# Patient Record
Sex: MALE | Race: OTHER | Employment: FULL TIME | URBAN - METROPOLITAN AREA
[De-identification: names, ages, dates, MRNs, and addresses within clinical notes are randomized per-mention and may not be internally consistent; named-entity substitution may affect disease eponyms.]

---
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
## 2018-04-11 ENCOUNTER — TELEPHONE (OUTPATIENT)
Dept: INTERNAL MEDICINE CLINIC | Facility: CLINIC | Age: 18
End: 2018-04-11

## 2018-12-05 ENCOUNTER — OFFICE VISIT (OUTPATIENT)
Dept: FAMILY MEDICINE CLINIC | Facility: CLINIC | Age: 18
End: 2018-12-05
Payer: COMMERCIAL

## 2018-12-05 VITALS
DIASTOLIC BLOOD PRESSURE: 78 MMHG | SYSTOLIC BLOOD PRESSURE: 118 MMHG | WEIGHT: 139.81 LBS | BODY MASS INDEX: 21.19 KG/M2 | OXYGEN SATURATION: 100 % | HEIGHT: 68 IN | HEART RATE: 82 BPM | RESPIRATION RATE: 16 BRPM | TEMPERATURE: 98 F

## 2018-12-05 DIAGNOSIS — R11.2 NAUSEA AND VOMITING, INTRACTABILITY OF VOMITING NOT SPECIFIED, UNSPECIFIED VOMITING TYPE: Primary | ICD-10-CM

## 2018-12-05 PROCEDURE — 99213 OFFICE O/P EST LOW 20 MIN: CPT | Performed by: NURSE PRACTITIONER

## 2018-12-05 NOTE — PROGRESS NOTES
CHIEF COMPLAINT:   Patient presents with:  Nausea: stomachache, nausea      HPI:   Nicole Bryant is a 19year old male who presents for complaints of vomiting that occurred yesterday. Reports he also had generalized mild cramping abdominal pain.   Brina RESPIRATORY: denies shortness of breath, cough or wheezing  GI:See HPI  NEURO: denies headaches, loss of bowel or bladder control    EXAM:   /78 (BP Location: Left arm, Patient Position: Sitting, Cuff Size: adult)   Pulse 82   Temp 98.2 °F (36.8 °C) Vomiting and diarrhea can make you miserable. Your stomach and bowels are reacting to an irritant. This might be food, medicine, or viral stomach flu. Vomiting and diarrhea are two ways your body can remove the problem from your system.  Nausea is a symptom · Certain over-the-counter antihistamines can help control nausea. Other medicines can help soothe stomach upset. Ask your healthcare provider which medicines may help you.   When to call your healthcare provider  Call your healthcare provider if you have:

## 2019-11-20 ENCOUNTER — OFFICE VISIT (OUTPATIENT)
Dept: INTERNAL MEDICINE CLINIC | Facility: CLINIC | Age: 19
End: 2019-11-20
Payer: COMMERCIAL

## 2019-11-20 VITALS
TEMPERATURE: 98 F | SYSTOLIC BLOOD PRESSURE: 120 MMHG | BODY MASS INDEX: 21.25 KG/M2 | WEIGHT: 143.5 LBS | RESPIRATION RATE: 16 BRPM | HEART RATE: 68 BPM | OXYGEN SATURATION: 98 % | HEIGHT: 69 IN | DIASTOLIC BLOOD PRESSURE: 76 MMHG

## 2019-11-20 DIAGNOSIS — M25.511 ACUTE PAIN OF RIGHT SHOULDER: Primary | ICD-10-CM

## 2019-11-20 PROCEDURE — 99213 OFFICE O/P EST LOW 20 MIN: CPT | Performed by: FAMILY MEDICINE

## 2019-11-20 NOTE — PROGRESS NOTES
Patient presents with:  Shoulder Pain: Pt states has happened in the past but is worse now, Pt states it comes and goes. Pt has last about a month the longest. Pt states it is in both shoulders. Pt has Rt shoulder pain right now. OTC ibuprofen.  Pt states w rashes  RESPIRATORY: denies shortness of breath with exertion  CARDIOVASCULAR: denies chest pain on exertion  GI: denies abdominal pain and denies heartburn  NEURO: denies headaches  Musculoskeletal: pain as described above, see HPI     EXAM:   /76 ( plan.

## 2020-02-05 ENCOUNTER — OFFICE VISIT (OUTPATIENT)
Dept: INTERNAL MEDICINE CLINIC | Facility: CLINIC | Age: 20
End: 2020-02-05
Payer: COMMERCIAL

## 2020-02-05 VITALS
HEART RATE: 66 BPM | SYSTOLIC BLOOD PRESSURE: 116 MMHG | HEIGHT: 68.5 IN | OXYGEN SATURATION: 99 % | RESPIRATION RATE: 16 BRPM | WEIGHT: 146 LBS | TEMPERATURE: 98 F | DIASTOLIC BLOOD PRESSURE: 78 MMHG | BODY MASS INDEX: 21.87 KG/M2

## 2020-02-05 DIAGNOSIS — L03.90 CELLULITIS, UNSPECIFIED CELLULITIS SITE: Primary | ICD-10-CM

## 2020-02-05 PROCEDURE — 99213 OFFICE O/P EST LOW 20 MIN: CPT | Performed by: FAMILY MEDICINE

## 2020-02-05 RX ORDER — CEFADROXIL 500 MG/1
500 CAPSULE ORAL 2 TIMES DAILY
Qty: 14 CAPSULE | Refills: 0 | Status: SHIPPED | OUTPATIENT
Start: 2020-02-05 | End: 2020-07-28 | Stop reason: ALTCHOICE

## 2020-02-05 NOTE — PROGRESS NOTES
HPI:    Patient ID: Lori Frank is a 22year old male.     HPI  Yesterday noted a ed bump on the left upper cheek  Thought it wa s a pimple  Tried to pop it  This AM it is more swollen in the area and under the L eye is puffy as well   No f/c   No DC fr

## 2020-07-28 ENCOUNTER — OFFICE VISIT (OUTPATIENT)
Dept: INTERNAL MEDICINE CLINIC | Facility: CLINIC | Age: 20
End: 2020-07-28
Payer: COMMERCIAL

## 2020-07-28 ENCOUNTER — HOSPITAL ENCOUNTER (OUTPATIENT)
Dept: GENERAL RADIOLOGY | Age: 20
Discharge: HOME | End: 2020-07-28
Attending: NURSE PRACTITIONER
Payer: COMMERCIAL

## 2020-07-28 VITALS
WEIGHT: 147 LBS | OXYGEN SATURATION: 98 % | DIASTOLIC BLOOD PRESSURE: 72 MMHG | RESPIRATION RATE: 16 BRPM | TEMPERATURE: 98 F | BODY MASS INDEX: 22.02 KG/M2 | SYSTOLIC BLOOD PRESSURE: 120 MMHG | HEART RATE: 88 BPM | HEIGHT: 68.5 IN

## 2020-07-28 DIAGNOSIS — G89.29 CHRONIC LEFT SHOULDER PAIN: Primary | ICD-10-CM

## 2020-07-28 DIAGNOSIS — M25.512 CHRONIC LEFT SHOULDER PAIN: Primary | ICD-10-CM

## 2020-07-28 DIAGNOSIS — M25.512 CHRONIC LEFT SHOULDER PAIN: ICD-10-CM

## 2020-07-28 DIAGNOSIS — G89.29 CHRONIC LEFT SHOULDER PAIN: ICD-10-CM

## 2020-07-28 PROCEDURE — 73030 X-RAY EXAM OF SHOULDER: CPT | Performed by: NURSE PRACTITIONER

## 2020-07-28 PROCEDURE — 3008F BODY MASS INDEX DOCD: CPT | Performed by: NURSE PRACTITIONER

## 2020-07-28 PROCEDURE — 99213 OFFICE O/P EST LOW 20 MIN: CPT | Performed by: NURSE PRACTITIONER

## 2020-07-28 PROCEDURE — 3078F DIAST BP <80 MM HG: CPT | Performed by: NURSE PRACTITIONER

## 2020-07-28 PROCEDURE — 3074F SYST BP LT 130 MM HG: CPT | Performed by: NURSE PRACTITIONER

## 2020-07-28 NOTE — PATIENT INSTRUCTIONS
RICE     Rest an injury, elevate it, and use ice and compression as directed. RICE stands for rest, ice, compression, and elevation. These can limit pain and swelling after an injury.  RICE may be recommended to help treat breaks (fractures), sprains, s · Injured body part becomes cold, blue, numb, or tingly  · Signs of infection. These include warmth in the skin, redness, drainage, or bad smell coming from the injured body part.   [place] last reviewed this educational content on 6/1/2018  © 8827-4951 Th · Don't do activities that make your pain worse. These include raising your arms overhead, repeating the same motion over and over, or lifting heavy objects. · Don’t hold your arm in one position for a long time. Keep it moving.   · Put an ice pack on the © 9022-0202 [address] [facility], [facility] [place]. All rights reserved. This information is not intended as a substitute for professional medical care. Always follow your healthcare professional's instructions.

## 2020-07-28 NOTE — PROGRESS NOTES
CHIEF COMPLAINT:     Patient presents with:  Shoulder Pain: Pt states left shoulder pain began yesterday. No injures - pt states he just was walking/driving around.  He has had these pains in the past. Pt took Ibuprofen yday and Aleve this am. No help with /72 (BP Location: Right arm, Patient Position: Sitting, Cuff Size: adult)   Pulse 88   Temp 98.2 °F (36.8 °C) (Oral)   Resp 16   Ht 68.5\"   Wt 147 lb (66.7 kg)   SpO2 98%   BMI 22.03 kg/m²   GENERAL: well developed, well nourished,in no apparent dis · Ice. Applying ice right after an injury helps relieve pain and swelling. To make an ice pack, put ice cubes in a plastic bag that seals at the top. Wrap the bag in a clean, thin towel or cloth. Then place it over the injured area.  Do this for 10 to 15 mi The rotator cuff is a group of muscles and tendons that surround the shoulder joint. These muscles and tendons hold the arm in its joint. They help the shoulder move.  The rotator cuff muscles and tendons can become irritated from repeated rubbing against t · Put an ice pack on the sore area for 20 minutes every 1 to 2 hours for the first day. You can make an ice pack by putting ice cubes in a plastic bag. Wrap the bag in a thin towel before putting it on your shoulder.  A frozen bag of peas or something simil

## 2021-01-08 ENCOUNTER — HOSPITAL ENCOUNTER (OUTPATIENT)
Age: 21
Discharge: HOME | End: 2021-01-08
Payer: COMMERCIAL

## 2021-01-08 VITALS
TEMPERATURE: 98 F | HEART RATE: 65 BPM | SYSTOLIC BLOOD PRESSURE: 119 MMHG | HEIGHT: 69 IN | RESPIRATION RATE: 16 BRPM | WEIGHT: 155 LBS | DIASTOLIC BLOOD PRESSURE: 54 MMHG | OXYGEN SATURATION: 100 % | BODY MASS INDEX: 22.96 KG/M2

## 2021-01-08 DIAGNOSIS — Z20.822 ENCOUNTER FOR LABORATORY TESTING FOR COVID-19 VIRUS: Primary | ICD-10-CM

## 2021-01-08 PROCEDURE — 99213 OFFICE O/P EST LOW 20 MIN: CPT

## 2021-01-08 PROCEDURE — 99203 OFFICE O/P NEW LOW 30 MIN: CPT

## 2021-01-08 NOTE — ED INITIAL ASSESSMENT (HPI)
Pt presents today for covid testing. Pt states that his little brother just tested + for covid. Pt lives with his brother. Pt denies any symptoms at this time.

## 2021-01-08 NOTE — ED PROVIDER NOTES
Patient Seen in: [facility]      History   Patient presents with:  Testing    Stated Complaint: COVID TEST    HPI/Subjective:   HPI  Patient is a 18-year-old male presenting to the immediate care center with reports of exposure to COVID-1 noted above.     Physical Exam     ED Triage Vitals [01/08/21 1017]   /54   Pulse 65   Resp 16   Temp 97.8 °F (36.6 °C)   Temp src Tympanic   SpO2 100 %   O2 Device None (Room air)       Current:/54   Pulse 65   Temp 97.8 °F (36.6 °C) (Tympanic) refill takes less than 2 seconds. Findings: No rash. Neurological:      General: No focal deficit present. Mental Status: He is alert and oriented to person, place, and time. Cranial Nerves: No cranial nerve deficit.       Sensory: No senso

## 2021-01-10 LAB — SARS-COV-2 BY PCR: NOT DETECTED

## 2021-01-21 ENCOUNTER — HOSPITAL ENCOUNTER (OUTPATIENT)
Age: 21
Discharge: HOME | End: 2021-01-21
Payer: COMMERCIAL

## 2021-01-21 VITALS
TEMPERATURE: 99 F | DIASTOLIC BLOOD PRESSURE: 78 MMHG | OXYGEN SATURATION: 99 % | SYSTOLIC BLOOD PRESSURE: 125 MMHG | HEART RATE: 68 BPM | RESPIRATION RATE: 14 BRPM

## 2021-01-21 DIAGNOSIS — U07.1 REAL TIME REVERSE TRANSCRIPTASE PCR POSITIVE FOR 2019 NOVEL CORONAVIRUS: Primary | ICD-10-CM

## 2021-01-21 LAB — SARS-COV-2 RNA RESP QL NAA+PROBE: DETECTED

## 2021-01-21 PROCEDURE — 99213 OFFICE O/P EST LOW 20 MIN: CPT

## 2021-01-21 NOTE — ED PROVIDER NOTES
Patient Seen in: [facility]      History   Patient presents with:  Headache    Stated Complaint: COVID TEST-- HA AND SORE THROAT X 3 DAYS    HPI/Subjective:   HPI    47-year-old male here with complaint of close exposure to Covid in tande normal.      Mouth/Throat:      Mouth: Mucous membranes are moist.   Eyes:      Conjunctiva/sclera: Conjunctivae normal.      Pupils: Pupils are equal, round, and reactive to light. Neck:      Musculoskeletal: Normal range of motion and neck supple.    Ca encounter diagnosis)    Disposition:  Discharge  1/21/2021  6:19 pm    Follow-up:  Basilio Harmon MD  [address] [place] [address]  [facility] [id number] [phone]                Medications Prescribed:  There are no discharge medications for this patient.

## 2024-09-30 ENCOUNTER — TELEPHONE (OUTPATIENT)
Dept: INTERNAL MEDICINE CLINIC | Facility: CLINIC | Age: 24
End: 2024-09-30

## 2024-10-02 ENCOUNTER — OFFICE VISIT (OUTPATIENT)
Dept: INTERNAL MEDICINE CLINIC | Facility: CLINIC | Age: 24
End: 2024-10-02
Payer: COMMERCIAL

## 2024-10-02 ENCOUNTER — LAB ENCOUNTER (OUTPATIENT)
Dept: LAB | Age: 24
End: 2024-10-02
Attending: FAMILY MEDICINE
Payer: COMMERCIAL

## 2024-10-02 VITALS
HEART RATE: 80 BPM | BODY MASS INDEX: 24.03 KG/M2 | DIASTOLIC BLOOD PRESSURE: 80 MMHG | WEIGHT: 160.38 LBS | HEIGHT: 68.5 IN | OXYGEN SATURATION: 98 % | SYSTOLIC BLOOD PRESSURE: 112 MMHG | TEMPERATURE: 98 F

## 2024-10-02 DIAGNOSIS — Z00.00 LABORATORY EXAM ORDERED AS PART OF ROUTINE GENERAL MEDICAL EXAMINATION: ICD-10-CM

## 2024-10-02 DIAGNOSIS — Z00.00 WELLNESS EXAMINATION: ICD-10-CM

## 2024-10-02 DIAGNOSIS — Z00.00 WELLNESS EXAMINATION: Primary | ICD-10-CM

## 2024-10-02 LAB
ALBUMIN SERPL-MCNC: 5 G/DL (ref 3.2–4.8)
ALBUMIN/GLOB SERPL: 2 {RATIO} (ref 1–2)
ALP LIVER SERPL-CCNC: 74 U/L
ALT SERPL-CCNC: 22 U/L
ANION GAP SERPL CALC-SCNC: 7 MMOL/L (ref 0–18)
AST SERPL-CCNC: 21 U/L (ref ?–34)
BASOPHILS # BLD AUTO: 0.03 X10(3) UL (ref 0–0.2)
BASOPHILS NFR BLD AUTO: 0.5 %
BILIRUB SERPL-MCNC: 0.6 MG/DL (ref 0.3–1.2)
BILIRUB UR QL STRIP.AUTO: NEGATIVE
BUN BLD-MCNC: 13 MG/DL (ref 9–23)
CALCIUM BLD-MCNC: 10 MG/DL (ref 8.7–10.4)
CHLORIDE SERPL-SCNC: 107 MMOL/L (ref 98–112)
CHOLEST SERPL-MCNC: 108 MG/DL (ref ?–200)
CLARITY UR REFRACT.AUTO: CLEAR
CO2 SERPL-SCNC: 28 MMOL/L (ref 21–32)
COLOR UR AUTO: YELLOW
CREAT BLD-MCNC: 0.95 MG/DL
EGFRCR SERPLBLD CKD-EPI 2021: 114 ML/MIN/1.73M2 (ref 60–?)
EOSINOPHIL # BLD AUTO: 0.23 X10(3) UL (ref 0–0.7)
EOSINOPHIL NFR BLD AUTO: 4.2 %
ERYTHROCYTE [DISTWIDTH] IN BLOOD BY AUTOMATED COUNT: 12.5 %
FASTING PATIENT LIPID ANSWER: NO
FASTING STATUS PATIENT QL REPORTED: NO
GLOBULIN PLAS-MCNC: 2.5 G/DL (ref 2–3.5)
GLUCOSE BLD-MCNC: 83 MG/DL (ref 70–99)
GLUCOSE UR STRIP.AUTO-MCNC: NORMAL MG/DL
HCT VFR BLD AUTO: 47.5 %
HDLC SERPL-MCNC: 46 MG/DL (ref 40–59)
HGB BLD-MCNC: 16.2 G/DL
IMM GRANULOCYTES # BLD AUTO: 0.02 X10(3) UL (ref 0–1)
IMM GRANULOCYTES NFR BLD: 0.4 %
KETONES UR STRIP.AUTO-MCNC: NEGATIVE MG/DL
LDLC SERPL CALC-MCNC: 47 MG/DL (ref ?–100)
LEUKOCYTE ESTERASE UR QL STRIP.AUTO: 250
LYMPHOCYTES # BLD AUTO: 1.53 X10(3) UL (ref 1–4)
LYMPHOCYTES NFR BLD AUTO: 28 %
MCH RBC QN AUTO: 30.7 PG (ref 26–34)
MCHC RBC AUTO-ENTMCNC: 34.1 G/DL (ref 31–37)
MCV RBC AUTO: 90.1 FL
MONOCYTES # BLD AUTO: 0.48 X10(3) UL (ref 0.1–1)
MONOCYTES NFR BLD AUTO: 8.8 %
NEUTROPHILS # BLD AUTO: 3.17 X10 (3) UL (ref 1.5–7.7)
NEUTROPHILS # BLD AUTO: 3.17 X10(3) UL (ref 1.5–7.7)
NEUTROPHILS NFR BLD AUTO: 58.1 %
NITRITE UR QL STRIP.AUTO: NEGATIVE
NONHDLC SERPL-MCNC: 62 MG/DL (ref ?–130)
OSMOLALITY SERPL CALC.SUM OF ELEC: 293 MOSM/KG (ref 275–295)
PH UR STRIP.AUTO: 6 [PH] (ref 5–8)
PLATELET # BLD AUTO: 267 10(3)UL (ref 150–450)
POTASSIUM SERPL-SCNC: 4.3 MMOL/L (ref 3.5–5.1)
PROT SERPL-MCNC: 7.5 G/DL (ref 5.7–8.2)
RBC # BLD AUTO: 5.27 X10(6)UL
RBC UR QL AUTO: NEGATIVE
SODIUM SERPL-SCNC: 142 MMOL/L (ref 136–145)
SP GR UR STRIP.AUTO: >1.03 (ref 1–1.03)
TRIGL SERPL-MCNC: 72 MG/DL (ref 30–149)
UROBILINOGEN UR STRIP.AUTO-MCNC: NORMAL MG/DL
VLDLC SERPL CALC-MCNC: 10 MG/DL (ref 0–30)
WBC # BLD AUTO: 5.5 X10(3) UL (ref 4–11)
WBC #/AREA URNS AUTO: >50 /HPF

## 2024-10-02 PROCEDURE — 87086 URINE CULTURE/COLONY COUNT: CPT | Performed by: FAMILY MEDICINE

## 2024-10-02 PROCEDURE — 81001 URINALYSIS AUTO W/SCOPE: CPT | Performed by: FAMILY MEDICINE

## 2024-10-02 PROCEDURE — 99395 PREV VISIT EST AGE 18-39: CPT | Performed by: FAMILY MEDICINE

## 2024-10-02 PROCEDURE — 85025 COMPLETE CBC W/AUTO DIFF WBC: CPT | Performed by: FAMILY MEDICINE

## 2024-10-02 PROCEDURE — 80053 COMPREHEN METABOLIC PANEL: CPT | Performed by: FAMILY MEDICINE

## 2024-10-02 PROCEDURE — 80061 LIPID PANEL: CPT | Performed by: FAMILY MEDICINE

## 2024-10-02 NOTE — PROGRESS NOTES
Elia Tejeda is a 23 year old male who presents for a complete physical exam.   HPI:       Wt Readings from Last 6 Encounters:   10/02/24 160 lb 6.4 oz (72.8 kg)   21 155 lb (70.3 kg)   20 147 lb (66.7 kg)   20 146 lb (66.2 kg)   19 143 lb 8 oz (65.1 kg)   18 139 lb 12.8 oz (63.4 kg)     Body mass index is 24.03 kg/m².     AST (U/L)   Date Value   2017 17     ALT (U/L)   Date Value   2017 30      No current outpatient medications on file.      Past Medical History:    Chronic rhinitis    Varicella    as child      Past Surgical History:   Procedure Laterality Date    Other surgical history      right arm sx x3 d/t fx    Other surgical history      finger sx    Tonsillectomy  4    w adenoids      History reviewed. No pertinent family history.   Social History:  Social History     Socioeconomic History    Marital status: Single   Tobacco Use    Smoking status: Some Days     Current packs/day: 0.00     Types: Cigarettes     Start date: 2013     Last attempt to quit: 2017     Years since quittin.7    Smokeless tobacco: Never   Vaping Use    Vaping status: Former    Start date: 2017    Quit date: 2018   Substance and Sexual Activity    Alcohol use: Yes     Comment: 2020: 12-20 drinks per week     Drug use: Not Currently    Sexual activity: Yes   Other Topics Concern    Caffeine Concern Yes     Comment: 1 can daily.    Exercise Yes     Comment: Daily      .        REVIEW OF SYSTEMS:   GENERAL: feels well otherwise  SKIN: denies rash  HEENT: denies nasal congestion, sinus pain or ST  LUNGS: denies shortness of breath    smokes occ   1 pk in 2 weeks   CARDIOVASCULAR: denies chest pain on exertion  GI: denies abdominal pain  : denies dysuria  NEURO: denies headaches  PSYCHE: denies depression or anxiety  HEMATOLOGIC: denies hx of anemia  ENDOCRINE: denies thyroid history  ALL/ASTHMA: denies hx of allergy or asthma    EXAM:   /80 (BP Location: Right  arm, Patient Position: Sitting, Cuff Size: adult)   Pulse 80   Temp 98.2 °F (36.8 °C) (Temporal)   Ht 5' 8.5\" (1.74 m)   Wt 160 lb 6.4 oz (72.8 kg)   SpO2 98%   BMI 24.03 kg/m²   Body mass index is 24.03 kg/m².   GENERAL: well developed, well nourished,in no apparent distress  SKIN: no rashes,  HEENT: atraumatic, normocephalic,ears and throat are clear  NECK: supple,no adenopathy  LUNGS: clear to auscultation  CARDIO: RRR without murmur  GI: good BS's,no masses, HSM or tenderness  : two descended testes,no masses,no hernia,no penile lesions  EXTREMITIES: no edema  NEURO: motor and sensory are grossly intact    ASSESSMENT AND PLAN:   Elia Tejeda is a 23 year old male who presents for a complete physical exam.  Health maintenance, will check fasting Lipids, CMP, CBC and UA.   Discussed smoking     The patient indicates understanding of these issues and agrees to the plan.  The patient is asked to return for CPX in 1yr.

## 2024-10-08 ENCOUNTER — TELEPHONE (OUTPATIENT)
Dept: INTERNAL MEDICINE CLINIC | Facility: CLINIC | Age: 24
End: 2024-10-08

## 2024-10-08 DIAGNOSIS — R35.0 URINARY FREQUENCY: Primary | ICD-10-CM

## 2024-10-08 NOTE — TELEPHONE ENCOUNTER
See lab results   Possible UTI per TO     Patient informed of lab results   Patient stated he has been drinking a lot of energy drinks but has cut them out. Declines burning with urination however after urinating, he still feels the need to go.     Did you want to send abx?

## 2024-10-09 ENCOUNTER — LAB ENCOUNTER (OUTPATIENT)
Dept: LAB | Age: 24
End: 2024-10-09
Attending: FAMILY MEDICINE
Payer: COMMERCIAL

## 2024-10-09 DIAGNOSIS — R35.0 URINARY FREQUENCY: ICD-10-CM

## 2024-10-09 PROCEDURE — 87591 N.GONORRHOEAE DNA AMP PROB: CPT

## 2024-10-09 PROCEDURE — 87491 CHLMYD TRACH DNA AMP PROBE: CPT

## 2024-10-09 NOTE — TELEPHONE ENCOUNTER
S/w pt.   Will do G/C test.   Stream is normal, not weaker or stronger.     Just has the urge to go, frequency.   Stated we'll reach out to him after those are resulted. He v/u

## 2024-10-10 ENCOUNTER — TELEPHONE (OUTPATIENT)
Dept: INTERNAL MEDICINE CLINIC | Facility: CLINIC | Age: 24
End: 2024-10-10

## 2024-10-10 LAB
C TRACH DNA SPEC QL NAA+PROBE: POSITIVE
N GONORRHOEA DNA SPEC QL NAA+PROBE: NEGATIVE

## 2024-10-10 RX ORDER — DOXYCYCLINE 100 MG/1
100 CAPSULE ORAL 2 TIMES DAILY
Qty: 14 CAPSULE | Refills: 0 | Status: SHIPPED | OUTPATIENT
Start: 2024-10-10 | End: 2024-10-17

## 2024-10-10 NOTE — TELEPHONE ENCOUNTER
Please see abnormal result from lab     Chlamydia Trachomatis Amplified RNA   Positive Abnormal

## 2024-10-10 NOTE — TELEPHONE ENCOUNTER
Incoming call from Sue with [place] reference lab in regards to abnormal test results. Please call back and advise.

## 2024-10-11 NOTE — TELEPHONE ENCOUNTER
Spoke with [facility] Dept to inform antibiotic prescribed and day supply.

## 2025-07-01 ENCOUNTER — OFFICE VISIT (OUTPATIENT)
Dept: FAMILY MEDICINE CLINIC | Facility: CLINIC | Age: 25
End: 2025-07-01

## 2025-07-01 DIAGNOSIS — S64.40XA LACERATION OF DIGITAL NERVE OF FINGER, INITIAL ENCOUNTER: Primary | ICD-10-CM

## 2025-07-01 PROCEDURE — 99203 OFFICE O/P NEW LOW 30 MIN: CPT | Performed by: NURSE PRACTITIONER

## 2025-07-02 NOTE — PROGRESS NOTES
CHIEF COMPLAINT:     Chief Complaint   Patient presents with    Laceration/Abrasion       HPI:     Elia Tejeda is a 24 year old male who presents with concerns of laceration to right index finger. Pt states this happened about 2 hours ago a work, finger got caught on a cabinet. Requesting closure of wound.      Current Medications[1]   Past Medical History[2]   Social History:  Short Social Hx on File[3]     REVIEW OF SYSTEMS:   GENERAL: feels well otherwise, no fever, no chills.  SKIN: as above.      EXAM:   There were no vitals taken for this visit.  GENERAL: well developed, well nourished,in no apparent distress  SKIN: laceration noted to distal palmar aspect of right 2nd finger.       ASSESSMENT AND PLAN:     ASSESSMENT:  Encounter Diagnosis   Name Primary?    Laceration of digital nerve of finger, initial encounter Yes       PLAN:   Pt requesting closure of wound with possible glue. Discussed limitations of WIC. Advised we do not have dermabond at this level of care. Advised pt if he does not wish to go to higher level of care, I can place steri strip on the wound however discussed not as effective as sutures/ glue. Pt states he prefers to go to higher level of care, advised IC is already closing at this time. Pt states he will go to ER.     Requested Prescriptions      No prescriptions requested or ordered in this encounter              [1]   No current outpatient medications on file.   [2]   Past Medical History:   Chronic rhinitis    Varicella    as child   [3]   Social History  Socioeconomic History    Marital status: Single   Tobacco Use    Smoking status: Some Days     Current packs/day: 0.00     Types: Cigarettes     Start date: 2013     Last attempt to quit: 2017     Years since quittin.5    Smokeless tobacco: Never   Vaping Use    Vaping status: Former    Start date: 2017    Quit date: 2018   Substance and Sexual Activity    Alcohol use: Yes     Comment: 2020: 12-20 drinks  per week     Drug use: Not Currently    Sexual activity: Yes   Other Topics Concern    Caffeine Concern Yes     Comment: 1 can daily.    Exercise Yes     Comment: Daily

## (undated) NOTE — ED AVS SNAPSHOT
[facility] Emergency Department    [place]  [address] [place] [address]    Phone:  [phone]    Fax:  [address]   MRN: [id number]    Department:  [facility] Emergency Department   Date of Visit:  1/23 coverage for follow-up care and referrals. [address] [facility] [employer] [place] [phone]  Pediatric [phone] Emergency Department   [phone]       To Check ER Wait Times:  TEXT 'ERwait' to 17016      Click [email or web address] will be contacted. Please make sure we have your correct phone number before you leave. After you leave, you should follow the attached instructions. I have read and understand the instructions given to me by my caregivers.         24[facility] XR CHEST AP PORTABLE  (CPT=71010) (Final result) Result time:  01/23/17 18:59:04    Final result    Impression:    CONCLUSION:  No active disease seen.      Dictated by: Carlos Jimenez MD on 1/23/2017 at 18:57       Approved by: Carlos Jimenez MD

## (undated) NOTE — LETTER
January 23, 2017    Patient: Tae Saldivar   Date of Visit: 1/23/2017       To Whom It May Concern:    Saravanan Gallardo was seen and treated in our emergency department on 1/23/2017. He should not return to school until January 26, 2017.     If you have

## (undated) NOTE — LETTER
12/20/19        [address] [place] [address]      Dear [place],    [address] [facility] records indicate that you have outstanding lab work and or testing that was ordered for you and has not yet been completed:      XR SHOULDER, COMPLET

## (undated) NOTE — ED AVS SNAPSHOT
[facility] Emergency Department    [place]  [address] [address]    Phone:  [phone]    Fax:  [phone]   MRN: [id number]    Department:  [facility] Emergency Department   Date of Visit:  1/23 IF THERE IS ANY CHANGE OR WORSENING OF YOUR CONDITION, CALL YOUR PRIMARY CARE PHYSICIAN AT ONCE OR RETURN IMMEDIATELY TO THE EMERGENCY DEPARTMENT.     If you have been prescribed any medication(s), please fill your prescription right away and begin taking t

## (undated) NOTE — MR AVS SNAPSHOT
[place]  [address] [facility] [facility] [place] [address]  [address] [facility] [phone]               Thank you for choosing us for your health care visit with Jaylyn Reynoso MD.  We are glad to serve you and happy to provide you with this sum Support Staff. Remember, MyChart is NOT to be used for urgent needs. For medical emergencies, dial 911.            Visit [facility] online at  [email or web address]

## (undated) NOTE — LETTER
Date & Time: 1/8/2021, 10:43 AM  Patient: Ruth Jason  Encounter Provider(s):    Clinton Torres       To Whom It May Concern:    Lilliana Werner was seen and treated in our department on 1/8/2021.  He should not return to work until COVID-19 test res

## (undated) NOTE — Clinical Note
Date: 1/30/2017    Patient Name: Ruth Jason          To Whom it may concern: This letter has been written at the patient's request. The above patient was seen at the [facility] for treatment of a medical condition.     This patient dylan